# Patient Record
(demographics unavailable — no encounter records)

---

## 2025-05-20 NOTE — PHYSICAL EXAM
[de-identified] : On physical examination the patient has a full range of motion of the hips knees and ankles.  She has a full range of motion of the first metatarsophalangeal joints and DIP joints bilaterally.  She does have tender bunions bilaterally the left side more painful than the right.  She has bilateral increased 1st and 2nd metatarsal angles. [de-identified] : X-ray evaluation of the feet on 5/20/2025 (AP, lateral and oblique views) reveals a bilateral hallux valgus without degenerative changes in the first metatarsophalangeal joints bilaterally.

## 2025-05-20 NOTE — ASSESSMENT
[FreeTextEntry1] : Painful bilateral hallux valgus  I have discussed surgical correction of this problem.  I have described the nature of the surgery.  I advised her that this would require an osteotomy of the first metatarsal as well as removal of the bunion.  She has been made aware that orthopedic hardware would be necessary to correct the first metatarsal osteotomy.  Potential surgical complications have been discussed including infection as well as possible neurovascular compromise.  All questions have been answered.  The patient will be calling to schedule the surgery.  I advised her to do the left side first followed at another surgical setting by the right side.  I have explained to her that a cast would probably be used for a period of time and she would not be able to walk on the foot for approximately 1 month.

## 2025-05-20 NOTE — CONSULT LETTER
[Dear  ___] : Dear  [unfilled], [Consult Letter:] : I had the pleasure of evaluating your patient, [unfilled]. [Please see my note below.] : Please see my note below. [Consult Closing:] : Thank you very much for allowing me to participate in the care of this patient.  If you have any questions, please do not hesitate to contact me. [Sincerely,] : Sincerely, [FreeTextEntry3] : Dr Juarez

## 2025-05-20 NOTE — HISTORY OF PRESENT ILLNESS
[FreeTextEntry1] : This 57-year-old female is here for evaluation of a long history of painful bilateral hallux valgus.  The left side is more symptomatic at this time than the right.  She states that shoewear has become quite difficult for her.  Walking is difficult as well.